# Patient Record
Sex: FEMALE | Race: WHITE | NOT HISPANIC OR LATINO | Employment: OTHER | URBAN - METROPOLITAN AREA
[De-identification: names, ages, dates, MRNs, and addresses within clinical notes are randomized per-mention and may not be internally consistent; named-entity substitution may affect disease eponyms.]

---

## 2017-06-30 ENCOUNTER — APPOINTMENT (OUTPATIENT)
Dept: WOMENS IMAGING | Facility: HOSPITAL | Age: 57
End: 2017-06-30

## 2017-06-30 PROCEDURE — 77063 BREAST TOMOSYNTHESIS BI: CPT | Performed by: RADIOLOGY

## 2017-06-30 PROCEDURE — G0202 SCR MAMMO BI INCL CAD: HCPCS | Performed by: RADIOLOGY

## 2017-06-30 PROCEDURE — 77067 SCR MAMMO BI INCL CAD: CPT | Performed by: RADIOLOGY

## 2017-07-06 ENCOUNTER — OFFICE VISIT (OUTPATIENT)
Dept: OBSTETRICS AND GYNECOLOGY | Age: 57
End: 2017-07-06

## 2017-07-06 VITALS
WEIGHT: 175 LBS | BODY MASS INDEX: 28.12 KG/M2 | HEIGHT: 66 IN | DIASTOLIC BLOOD PRESSURE: 78 MMHG | SYSTOLIC BLOOD PRESSURE: 126 MMHG

## 2017-07-06 DIAGNOSIS — Z80.3 FAMILY HISTORY OF BREAST CANCER: ICD-10-CM

## 2017-07-06 DIAGNOSIS — Z01.419 WELL WOMAN EXAM WITH ROUTINE GYNECOLOGICAL EXAM: Primary | ICD-10-CM

## 2017-07-06 PROCEDURE — 99396 PREV VISIT EST AGE 40-64: CPT | Performed by: PHYSICIAN ASSISTANT

## 2017-07-06 NOTE — PROGRESS NOTES
Subjective     Chief Complaint   Patient presents with   • Gynecologic Exam     annual exam.       History of Present Illness    Marie Yao is a 56 y.o.  who presents for annual exam.  She is doing well.  Has no c/o.  Plans to see PCP next week for routine labs.  Sister just dxed with breast cancer at 50.  Found during a surgery to repair her implants.  She is in the early stages of dx and tx and planning genetic testing.  She also has a MGM that was dxed with breast cancer in her 50's    She is a Dr Hendrickson pt    She has h/o abnormal paps many years ago and had a LEEP.        Obstetric History:  OB History      Para Term  AB TAB SAB Ectopic Multiple Living    0 0 0 0 0 0 0 0 0 0         Menstrual History:     No LMP recorded. Patient is postmenopausal.         Current contraception: post menopausal status  History of abnormal Pap smear: yes - Leep many years ago  Received Gardasil immunization: no  Perform regular self breast exam: yes - occl  Family history of uterine or ovarian cancer: no  Family History of colon cancer: no  Family history of breast cancer: yes - MGM in her 50's and sister just dxed at 50    Mammogram: up to date.  Colonoscopy: up to date. wnl  DEXA: up to date. wnl    Exercise: moderately active  Calcium/Vitamin D: adequate intake and uses supplements    The following portions of the patient's history were reviewed and updated as appropriate: allergies, current medications, past family history, past medical history, past social history, past surgical history and problem list.    Review of Systems   All other systems reviewed and are negative.      Review of Systems   Constitutional: Negative for fatigue.   Respiratory: Negative for shortness of breath.    Gastrointestinal: Negative for abdominal pain.   Genitourinary: Negative for dysuria.   Neurological: Negative for headaches.   Psychiatric/Behavioral: Negative for dysphoric mood.         Objective   Physical  "Exam    /78  Ht 66\" (167.6 cm)  Wt 175 lb (79.4 kg)  Breastfeeding? No  BMI 28.25 kg/m2    General:   alert, appears stated age and cooperative   Neck: no adenopathy and thyroid normal to palpation   Heart: regular rate and rhythm   Lungs: clear to auscultation bilaterally   Abdomen: soft and nontender   Breast: inspection negative, no nipple discharge or bleeding, no masses or nodularity palpable   Vulva: normal   Vagina: normal mucosa, normal discharge   Cervix: no lesions   Uterus: normal size, non-tender   Adnexa: normal adnexa and no mass, fullness, tenderness   Rectal: normal rectal, no masses and guaiac negative stool obtained     Assessment/Plan   Marie was seen today for gynecologic exam.    Diagnoses and all orders for this visit:    Well woman exam with routine gynecological exam  -     Pap IG, Rfx HPV ASCU    Family history of breast cancer  -     [employer] [facility] Hereditary Cancer Screen        All questions answered.  Breast self exam technique reviewed and patient encouraged to perform self-exam monthly.  Discussed healthy lifestyle modifications.  Recommended 30 minutes of aerobic exercise five times per week.  Discussed calcium needs to prevent osteoporosis.      Disc pap guidelines, will do today given her h/o abnormals  Disc genetic testing, will do today.  If not covered, may consider Counsyl             "

## 2017-07-11 LAB
CONV .: ABNORMAL
CYTOLOGIST CVX/VAG CYTO: ABNORMAL
CYTOLOGY CVX/VAG DOC THIN PREP: ABNORMAL
DX ICD CODE: ABNORMAL
DX ICD CODE: ABNORMAL
HIV 1 & 2 AB SER-IMP: ABNORMAL
HPV I/H RISK 1 DNA CVX QL PROBE+SIG AMP: NEGATIVE
OTHER STN SPEC: ABNORMAL
PATH REPORT.FINAL DX SPEC: ABNORMAL
PATHOLOGIST CVX/VAG CYTO: ABNORMAL
STAT OF ADQ CVX/VAG CYTO-IMP: ABNORMAL

## 2017-07-12 ENCOUNTER — TELEPHONE (OUTPATIENT)
Dept: OBSTETRICS AND GYNECOLOGY | Age: 57
End: 2017-07-12

## 2017-07-12 NOTE — TELEPHONE ENCOUNTER
----- Message from SABRINA Cole sent at 7/12/2017  9:09 AM EDT -----  Her pap is mildly abnormal but the HPV is negative, will plan to repeat pap next year

## 2017-07-24 PROBLEM — Z13.71 BRCA NEGATIVE: Status: ACTIVE | Noted: 2017-07-24

## 2018-07-27 ENCOUNTER — APPOINTMENT (OUTPATIENT)
Dept: WOMENS IMAGING | Facility: HOSPITAL | Age: 58
End: 2018-07-27

## 2018-07-27 PROCEDURE — 77063 BREAST TOMOSYNTHESIS BI: CPT | Performed by: RADIOLOGY

## 2018-07-27 PROCEDURE — 77067 SCR MAMMO BI INCL CAD: CPT | Performed by: RADIOLOGY

## 2018-09-21 ENCOUNTER — OFFICE VISIT (OUTPATIENT)
Dept: OBSTETRICS AND GYNECOLOGY | Age: 58
End: 2018-09-21

## 2018-09-21 VITALS
WEIGHT: 181 LBS | SYSTOLIC BLOOD PRESSURE: 130 MMHG | DIASTOLIC BLOOD PRESSURE: 80 MMHG | HEIGHT: 66 IN | BODY MASS INDEX: 29.09 KG/M2

## 2018-09-21 DIAGNOSIS — Z01.419 ENCOUNTER FOR GYNECOLOGICAL EXAMINATION: Primary | ICD-10-CM

## 2018-09-21 PROCEDURE — 99396 PREV VISIT EST AGE 40-64: CPT | Performed by: NURSE PRACTITIONER

## 2018-09-21 NOTE — PROGRESS NOTES
Subjective       History of Present Illness    Chief Complaint   Patient presents with   • Gynecologic Exam     Last pap 17 ASCUS, HPV negative. MG 18.        Marie Yao is a 58 y.o. female who presents for annual exam. She has no problems.  No vaginal bleeding or complaints. No bowel or bladder problems.  She did genetic testing last year and it was negative.  Her sister that had breast cancer is doing well.    OB History    Para Term  AB Living   0 0 0 0 0 0   SAB TAB Ectopic Molar Multiple Live Births   0 0 0   0               The following portions of the patient's history were reviewed and updated as appropriate: allergies, current medications, past family history, past medical history, past social history, past surgical history and problem list.    Current contraception: post menopausal status  History of abnormal Pap smear: yes - years ago, has had LEEP  Received Gardasil immunization: no  Perform regular self breast exam: yes - occasionally  Family history of uterine or ovarian cancer: no  Family History of colon cancer: no  Family history of breast cancer: yes - sister and MGM.  patient is BRCA negative    Mammogram: up to date.  Colonoscopy: up to date.  DEXA: up to date.  Last Pap: ASCUS, HPV negative    Smoking status: Former Smoker                                                              Packs/day: 0.00      Years: 0.00         Smokeless tobacco: Not on file                       Exercise: moderately active  Calcium/Vitamin D: uses supplements    The following portions of the patient's history were reviewed and updated as appropriate: allergies, current medications, past family history, past medical history, past social history, past surgical history and problem list.    Review of Systems   Constitutional: Negative.    HENT: Negative.    Eyes: Negative.    Respiratory: Negative.    Cardiovascular: Negative.    Gastrointestinal: Negative.    Endocrine: Negative.   "  Genitourinary: Negative.    Musculoskeletal: Negative.    Skin: Negative.    Allergic/Immunologic: Negative.    Neurological: Negative.    Hematological: Negative.    Psychiatric/Behavioral: Negative.          Objective   Physical Exam   Constitutional: She is oriented to person, place, and time. She appears well-developed and well-nourished.   Neck: No thyroid mass present.   Cardiovascular: Normal rate, regular rhythm and normal heart sounds.    Pulmonary/Chest: Effort normal and breath sounds normal. Right breast exhibits no mass, no nipple discharge, no skin change and no tenderness. Left breast exhibits no mass, no nipple discharge, no skin change and no tenderness.   Abdominal: Soft. There is no tenderness.   Genitourinary: Vagina normal and uterus normal. There is no rash or lesion on the right labia. There is no rash or lesion on the left labia. Cervix exhibits no motion tenderness, no discharge and no friability. Right adnexum displays no mass and no tenderness. Left adnexum displays no mass and no tenderness.   Neurological: She is alert and oriented to person, place, and time.   Psychiatric: She has a normal mood and affect. Her behavior is normal.   Vitals reviewed.      /80   Ht 167.6 cm (66\")   Wt 82.1 kg (181 lb)   BMI 29.21 kg/m²     Assessment/Plan   Marie was seen today for gynecologic exam.    Diagnoses and all orders for this visit:    Encounter for gynecological examination  -     IGP,rfx Aptima HPV All Pth; Future        Breast self exam technique reviewed and patient encouraged to perform self-exam monthly.  Discussed healthy lifestyle modifications.  Pap smear done today with refelx HPV  Recommended 30 minutes of aerobic exercise five times per week.  Discussed calcium needs to prevent osteoporosis         "

## 2018-09-25 LAB
CONV .: NORMAL
CYTOLOGIST CVX/VAG CYTO: NORMAL
CYTOLOGY CVX/VAG DOC THIN PREP: NORMAL
DX ICD CODE: NORMAL
HIV 1 & 2 AB SER-IMP: NORMAL
OTHER STN SPEC: NORMAL
PATH REPORT.FINAL DX SPEC: NORMAL
STAT OF ADQ CVX/VAG CYTO-IMP: NORMAL

## 2019-07-30 ENCOUNTER — APPOINTMENT (OUTPATIENT)
Dept: WOMENS IMAGING | Facility: HOSPITAL | Age: 59
End: 2019-07-30

## 2019-07-30 PROCEDURE — 77063 BREAST TOMOSYNTHESIS BI: CPT | Performed by: RADIOLOGY

## 2019-07-30 PROCEDURE — 77067 SCR MAMMO BI INCL CAD: CPT | Performed by: RADIOLOGY

## 2020-08-21 ENCOUNTER — APPOINTMENT (OUTPATIENT)
Dept: WOMENS IMAGING | Facility: HOSPITAL | Age: 60
End: 2020-08-21

## 2020-08-21 PROCEDURE — 77063 BREAST TOMOSYNTHESIS BI: CPT | Performed by: RADIOLOGY

## 2020-08-21 PROCEDURE — 77067 SCR MAMMO BI INCL CAD: CPT | Performed by: RADIOLOGY

## 2021-01-13 ENCOUNTER — OFFICE VISIT (OUTPATIENT)
Dept: OBSTETRICS AND GYNECOLOGY | Age: 61
End: 2021-01-13

## 2021-01-13 VITALS
SYSTOLIC BLOOD PRESSURE: 118 MMHG | DIASTOLIC BLOOD PRESSURE: 70 MMHG | HEIGHT: 66 IN | WEIGHT: 151 LBS | BODY MASS INDEX: 24.27 KG/M2

## 2021-01-13 DIAGNOSIS — Z91.89 AT HIGH RISK FOR BREAST CANCER: ICD-10-CM

## 2021-01-13 DIAGNOSIS — Z11.51 SCREENING FOR HPV (HUMAN PAPILLOMAVIRUS): ICD-10-CM

## 2021-01-13 DIAGNOSIS — Z01.419 WELL WOMAN EXAM WITH ROUTINE GYNECOLOGICAL EXAM: Primary | ICD-10-CM

## 2021-01-13 DIAGNOSIS — Z12.31 ENCOUNTER FOR SCREENING MAMMOGRAM FOR BREAST CANCER: ICD-10-CM

## 2021-01-13 LAB
DEVELOPER EXPIRATION DATE: NORMAL
DEVELOPER LOT NUMBER: NORMAL
EXPIRATION DATE: NORMAL
FECAL OCCULT BLOOD SCREEN, POC: NEGATIVE
Lab: 1591
NEGATIVE CONTROL: NEGATIVE
POSITIVE CONTROL: POSITIVE

## 2021-01-13 PROCEDURE — 99396 PREV VISIT EST AGE 40-64: CPT | Performed by: PHYSICIAN ASSISTANT

## 2021-01-13 PROCEDURE — 82274 ASSAY TEST FOR BLOOD FECAL: CPT | Performed by: PHYSICIAN ASSISTANT

## 2021-01-13 RX ORDER — DIPHENOXYLATE HYDROCHLORIDE AND ATROPINE SULFATE 2.5; .025 MG/1; MG/1
TABLET ORAL DAILY
COMMUNITY

## 2021-01-13 NOTE — PROGRESS NOTES
Subjective     Chief Complaint   Patient presents with   • Gynecologic Exam     annual exam c/o pain with intercourse,  last pap 2018 neg/no hpv, m/g 2020 neg, c/scope (cologard) at age 59.       History of Present Illness    Marie Yao is a 60 y.o.  who presents for annual exam.    Pt here for her annual exam  No new med hx  Sees Dr Dudley for routine check ups  Had labs done recently that were wnl    H/o abnormal pap  Will rpt today    Family h/o breast cancer  Had genetic testing which was negative but shows she is at high risk for breast cancer over her lifetime (>29%)  She has not had any f/u on this result  Is agreeable to an appt with general surgeon to discuss POC  She is good about BSE  Mammogram was done in August and was wnl       Pt of Dr Hendrickson  Obstetric History:  OB History        0    Para   0    Term   0       0    AB   0    Living   0       SAB   0    TAB   0    Ectopic   0    Molar        Multiple   0    Live Births                   Menstrual History:     No LMP recorded. Patient is postmenopausal.         Current contraception: post menopausal status  History of abnormal Pap smear: no  Received Gardasil immunization: no  Perform regular self breast exam: yes - mthly  Family history of uterine or ovarian cancer: no  Family History of colon cancer: no  Family history of breast cancer: yes - sister had it, pt tested neg    Mammogram: up to date.  Colonoscopy: up to date.  DEXA: ordered.    Exercise: moderately active  Calcium/Vitamin D: adequate intake    The following portions of the patient's history were reviewed and updated as appropriate: allergies, current medications, past family history, past medical history, past social history, past surgical history and problem list.    Review of Systems    Review of Systems   Constitutional: Negative for fatigue.   Respiratory: Negative for shortness of breath.    Gastrointestinal: Negative for abdominal pain.  "  Genitourinary: Negative for dysuria.   Neurological: Negative for headaches.   Psychiatric/Behavioral: Negative for dysphoric mood.          Objective   Physical Exam    /70   Ht 167.6 cm (66\")   Wt 68.5 kg (151 lb)   Breastfeeding No   BMI 24.37 kg/m²   General:   alert, comfortable and no distress   Heart: regular rate and rhythm   Lungs: clear to auscultation bilaterally   Breast: normal appearance, no masses or tenderness, Inspection negative, No nipple retraction or dimpling, No nipple discharge or bleeding, No axillary or supraclavicular adenopathy, Normal to palpation without dominant masses   Neck: no adenopathy and no carotid bruit   Abdomen: {normal findings: soft, non-tender   CVA: Not performed today   Pelvis: External genitalia: normal general appearance  Vaginal: normal mucosa without prolapse or lesions  Cervix: thin prep PAP obtained  Adnexa: normal bimanual exam  Uterus: normal single, nontender  Rectal: good sphincter tone, no masses, guaiac negative   Extremities: Not performed today   Neurologic: negative   Psychiatric: Normal affect, judgement, and mood     Assessment/Plan   Diagnoses and all orders for this visit:    1. Well woman exam with routine gynecological exam (Primary)  -     IgP, Aptima HPV    2. Encounter for screening mammogram for breast cancer  -     Mammo Screening Digital Tomosynthesis Bilateral With CAD; Future    3. At high risk for breast cancer  -     Ambulatory Referral to General Surgery    4. Screening for HPV (human papillomavirus)  -     IgP, Aptima HPV        All questions answered.  Breast self exam technique reviewed and patient encouraged to perform self-exam monthly.  Discussed healthy lifestyle modifications.  Recommended 30 minutes of aerobic exercise five times per week.  Discussed calcium needs to prevent osteoporosis.    Pap done today  Placed order for mammogram  Referral to general surgery to discuss breast cancer risk and reduction of risk  C/w " BSE  Enc f/u with Derm for routine skin evaluation

## 2021-01-14 ENCOUNTER — PROCEDURE VISIT (OUTPATIENT)
Dept: OBSTETRICS AND GYNECOLOGY | Age: 61
End: 2021-01-14

## 2021-01-14 DIAGNOSIS — Z78.0 POST-MENOPAUSAL: Primary | ICD-10-CM

## 2021-01-14 PROCEDURE — 77080 DXA BONE DENSITY AXIAL: CPT | Performed by: OBSTETRICS & GYNECOLOGY

## 2021-01-14 PROCEDURE — 77067 SCR MAMMO BI INCL CAD: CPT | Performed by: OBSTETRICS & GYNECOLOGY

## 2021-01-16 LAB
CYTOLOGIST CVX/VAG CYTO: NORMAL
CYTOLOGY CVX/VAG DOC CYTO: NORMAL
CYTOLOGY CVX/VAG DOC THIN PREP: NORMAL
DX ICD CODE: NORMAL
HIV 1 & 2 AB SER-IMP: NORMAL
HPV I/H RISK 4 DNA CVX QL PROBE+SIG AMP: NEGATIVE
OTHER STN SPEC: NORMAL
STAT OF ADQ CVX/VAG CYTO-IMP: NORMAL

## 2021-01-26 ENCOUNTER — TELEPHONE (OUTPATIENT)
Dept: OBSTETRICS AND GYNECOLOGY | Age: 61
End: 2021-01-26

## 2021-02-02 ENCOUNTER — OFFICE VISIT (OUTPATIENT)
Dept: MAMMOGRAPHY | Facility: CLINIC | Age: 61
End: 2021-02-02

## 2021-02-02 VITALS
BODY MASS INDEX: 27.57 KG/M2 | DIASTOLIC BLOOD PRESSURE: 78 MMHG | SYSTOLIC BLOOD PRESSURE: 142 MMHG | HEART RATE: 82 BPM | TEMPERATURE: 97.1 F | OXYGEN SATURATION: 95 % | HEIGHT: 65 IN | WEIGHT: 165.5 LBS

## 2021-02-02 DIAGNOSIS — Z91.89 AT HIGH RISK FOR BREAST CANCER: Primary | ICD-10-CM

## 2021-02-02 PROCEDURE — 99204 OFFICE O/P NEW MOD 45 MIN: CPT | Performed by: SURGERY

## 2021-02-02 RX ORDER — NICOTINE POLACRILEX 2 MG
GUM BUCCAL
COMMUNITY

## 2021-02-02 RX ORDER — MULTIVIT-MIN/IRON/FOLIC ACID/K 18-600-40
2 CAPSULE ORAL
COMMUNITY

## 2021-02-02 NOTE — PROGRESS NOTES
Chief Complaint: Marie Yao is a 60 y.o.. female here today for BRCA Mutation        History of Present Illness:  Patient presents with management of breast cancer risk.   She is a very nice 60-year-old white female with a strong family history for breast cancer.  She has a maternal grandmother and a sister who had breast cancer diagnosed at age 50.  The patient recently underwent genetic testing which was negative.  Her calculated lifetime risk was 29.9% and her 5-year risk was 5.1%.  She does have a history of a prior benign right breast biopsy.  According to the patient there was no atypia to her knowledge.  She has not had any children.  Her most recent imaging did not show any abnormalities.  I have personally reviewed those imaging studies and agree with those findings.  She does not take hormone replacement therapy.    Review of Systems:  Review of Systems   Skin:        The patient denies any noticeable changes to the skin of the breast.    All other systems reviewed and are negative.     I have reviewed the ROS as documented by the MA/LPN/RN Jah Millard MD      Past Medical and Surgical History:  Breast Biopsy History:  Patient has had the following breast biopsies:03/2003 benign  Breast Cancer HIstory:  Patient does not have a past medical history of breast cancer.  Breast Operations, and year:  0  Social History     Tobacco Use   Smoking Status Former Smoker     Obstetric History:  Patient is postmenopausal, entered menopause naturally at age: 49   Number of pregnancies:0  Number of live births: 0  Number of abortions or miscarriages: 0  Age of delivery of first child: 0  Patient did not breast feed.  Length of time taking birth control pills:10 years  Patient has never taken hormone replacement    Past Surgical History:   Procedure Laterality Date   • BREAST BIOPSY Right 03/28/2003    Benign fibroadenoma   • CERVICAL BIOPSY  W/ LOOP ELECTRODE EXCISION  04/19/1995    Ant CX: mild inflammation.   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  Margins neg.   • COLONOSCOPY  2009    WNL   • COLPOSCOPY W/ BIOPSY / CURETTAGE  03/01/1995    cx bx 7:00 mild dysplasia.   • GASTRIC RESTRICTION SURGERY  1986   • GYNECOLOGIC CRYOSURGERY  1982   • LAPAROSCOPIC TUBAL LIGATION  12/2005       Past Medical History:   Diagnosis Date   • Breast lump 2003    bx showed benign fibroadenoma   • Cervical dysplasia 1995   • Cervical dysplasia 1982       Prior Hospitalizations, other than for surgery or childbirth, and year:  0    Social History:  Patient is single.  Patient has no children.    Family History:  Family History   Problem Relation Age of Onset   • Heart disease Mother         4 vessel bypass    • Breast cancer Maternal Grandmother 68   • Breast cancer Sister 50       Vital Signs:  Vitals:    02/02/21 0824   BP: 142/78   Pulse: 82   Temp: 97.1 °F (36.2 °C)   SpO2: 95%       Medications:    Current Outpatient Prescriptions:     Current Outpatient Medications:   •  Biotin 1 MG capsule, Take  by mouth., Disp: , Rfl:   •  POTASSIUM GLUCONATE PO, Take  by mouth., Disp: , Rfl:   •  Vitamin D, Cholecalciferol, 50 MCG (2000 UT) capsule, Take 2 capsules by mouth., Disp: , Rfl:   •  multivitamin (MULTI-VITAMIN DAILY PO), Take  by mouth Daily., Disp: , Rfl:     Physical Examination:  General Appearance:   Patient is in no distress.  She is well kept and has an overweight build.   Psychiatric:  Patient with appropriate mood and affect. Alert and oriented to self, time, and place.    Breast, RIGHT:  large sized, symmetric with the contralateral side.  Breast skin is without erythema, edema, rashes.  There are no visible abnormalities upon inspection during the arm-raising maneuver or with hands on hips in the sitting position. There is no nipple retraction, discharge or nipple/areolar skin changes.There are no masses palpable in the sitting or supine positions.    Breast, LEFT:  large sized, symmetric with the  contralateral side.  Breast skin is without erythema, edema, rashes.  There are no visible abnormalities upon inspection during the arm-raising maneuver or with hands on hips in the sitting position. There is no nipple retraction, discharge or nipple/areolar skin changes.There are no masses palpable in the sitting or supine positions.    Lymphatic:  There is no axillary, cervical, infraclavicular, or supraclavicular adenopathy bilaterally.  Eyes:  Pupils are round and reactive to light.  Cardiovascular:  Heart rate and rhythm are regular.  Respiratory:  Lungs are clear bilaterally with no crackles or wheezes in any lung field.  Gastrointestinal:  Abdomen is soft, nondistended, and nontender.  There was no obvious hepatosplenomegaly or abdominal mass.  There is a long midline scar from a gastric stapling 30 years ago.    Musculoskeletal:  Good strength in all 4 extremities.   There is good range of motion in both shoulders.    Skin:  No new skin lesions or rashes on the skin excluding the breast (see breast exam above).    Assessment:  1. At high risk for breast cancer          Plan:  We reviewed her imaging, history, exam, pathology, and family history together today. Her most significant risk factors for breast cancer include the following:  I calculated her lifetime risk of breast cancer using the Tyrer-Cuzick model as: 29.9%  I calculated her 5-year risk of breast cancer using the Paige model (not useful for women under the age of 35 years) as: 5.1%.  With a lifetime risk of breast cancer greater than 20%, the current recommendations for screening include annual mammography and annual MRI, with clinical examination. She is interested in pursuing this.  We also discussed that for a Paige model 5 year risk greater than 1.7% or LCIS, and a life expectancy > 10 years, that we recommend risk reduction counseling with the medical oncologists about chemopreventive agents such as tamoxifen, raloxifene, or exemestane.  She  was not interested in pursuing that at this point in time but knows that she can always go that route if she desires.  We will go ahead and order an MRI and I will call her with those results.  I also would plan to see her back in the office in 1 year.      CPT coding:    Next Appointment:  No follow-ups on file.            EMR Dragon/transcription disclaimer:    Much of this encounter note is an electronic transcription/translocation of spoken language to printed text.  The electronic translation of spoken language may permit erroneous, or at times, nonsensical words or phrases to be inadvertently transcribed.  Although I have reviewed the note from such areas, some may still exist.

## 2021-02-19 ENCOUNTER — APPOINTMENT (OUTPATIENT)
Dept: MRI IMAGING | Facility: HOSPITAL | Age: 61
End: 2021-02-19

## 2021-03-10 ENCOUNTER — HOSPITAL ENCOUNTER (OUTPATIENT)
Dept: MRI IMAGING | Facility: HOSPITAL | Age: 61
Discharge: HOME | End: 2021-03-10
Admitting: SURGERY

## 2021-03-10 ENCOUNTER — TELEPHONE (OUTPATIENT)
Dept: MAMMOGRAPHY | Facility: CLINIC | Age: 61
End: 2021-03-10

## 2021-03-10 DIAGNOSIS — Z91.89 AT HIGH RISK FOR BREAST CANCER: ICD-10-CM

## 2021-03-10 LAB — CREAT BLDA-MCNC: 0.5 MG/DL (ref 0.6–1.3)

## 2021-03-10 PROCEDURE — 77049 MRI BREAST C-+ W/CAD BI: CPT

## 2021-03-10 PROCEDURE — 82565 ASSAY OF CREATININE: CPT

## 2021-03-10 PROCEDURE — A9577 INJ MULTIHANCE: HCPCS | Performed by: SURGERY

## 2021-03-10 PROCEDURE — 0 GADOBENATE DIMEGLUMINE 529 MG/ML SOLUTION: Performed by: SURGERY

## 2021-03-10 RX ADMIN — GADOBENATE DIMEGLUMINE 15 ML: 529 INJECTION, SOLUTION INTRAVENOUS at 09:23

## 2021-03-10 NOTE — TELEPHONE ENCOUNTER
I told her the breast MRI was negative.  They did not see any evidence of malignancy.  They did notice a 3 cm hepatic lesion likely representing a hemangioma.  I told her that she needs to bring this to the attention of her primary care doctor and she will do that.  I will see her in the office in 1 year.

## 2021-03-16 ENCOUNTER — BULK ORDERING (OUTPATIENT)
Dept: CASE MANAGEMENT | Facility: OTHER | Age: 61
End: 2021-03-16

## 2021-03-16 DIAGNOSIS — Z23 IMMUNIZATION DUE: ICD-10-CM

## 2021-08-23 ENCOUNTER — APPOINTMENT (OUTPATIENT)
Dept: WOMENS IMAGING | Facility: HOSPITAL | Age: 61
End: 2021-08-23

## 2021-08-23 PROCEDURE — 77063 BREAST TOMOSYNTHESIS BI: CPT | Performed by: RADIOLOGY

## 2021-08-23 PROCEDURE — 77067 SCR MAMMO BI INCL CAD: CPT | Performed by: RADIOLOGY

## 2022-01-18 ENCOUNTER — OFFICE VISIT (OUTPATIENT)
Dept: OBSTETRICS AND GYNECOLOGY | Age: 62
End: 2022-01-18

## 2022-01-18 VITALS
SYSTOLIC BLOOD PRESSURE: 132 MMHG | HEIGHT: 66 IN | WEIGHT: 159 LBS | BODY MASS INDEX: 25.55 KG/M2 | DIASTOLIC BLOOD PRESSURE: 84 MMHG

## 2022-01-18 DIAGNOSIS — Z01.419 WELL WOMAN EXAM WITH ROUTINE GYNECOLOGICAL EXAM: Primary | ICD-10-CM

## 2022-01-18 DIAGNOSIS — Z91.89 AT HIGH RISK FOR BREAST CANCER: ICD-10-CM

## 2022-01-18 DIAGNOSIS — Z13.71 BRCA NEGATIVE: ICD-10-CM

## 2022-01-18 PROCEDURE — 99396 PREV VISIT EST AGE 40-64: CPT | Performed by: PHYSICIAN ASSISTANT

## 2022-01-18 RX ORDER — ESCITALOPRAM OXALATE 10 MG/1
TABLET ORAL
COMMUNITY
Start: 2021-12-28

## 2022-01-18 NOTE — PROGRESS NOTES
Subjective     Chief Complaint   Patient presents with   • Gynecologic Exam     annual exam last pap 2021 neg/neg, mg/ 2021, dexa , cologard at 59       History of Present Illness    Marie Yao is a 61 y.o.  who presents for annual exam.    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 Patient is postmenopausal.         Current contraception: post menopausal status  History of abnormal Pap smear: no  Received Gardasil immunization: no  Perform regular self breast exam: yes - occl  Family history of uterine or ovarian cancer: no  Family History of colon cancer: no  Family history of breast cancer: yes - pt tested negative but at high risk    Mammogram: up to date.  Colonoscopy: recommended.  DEXA: up to date.    Exercise: moderately active  Calcium/Vitamin D: adequate intake    The following portions of the patient's history were reviewed and updated as appropriate: allergies, current medications, past family history, past medical history, past social history, past surgical history and problem list.    Review of Systems    Review of Systems   Constitutional: Negative for fatigue.   Respiratory: Negative for shortness of breath.    Gastrointestinal: Negative for abdominal pain.   Genitourinary: Negative for dysuria.   Neurological: Negative for headaches.   Psychiatric/Behavioral: Negative for dysphoric mood.          Objective  "  Physical Exam    /84   Ht 167.6 cm (66\")   Wt 72.1 kg (159 lb)   BMI 25.66 kg/m²   General:   alert, comfortable and no distress   Heart: regular rate and rhythm   Lungs: clear to auscultation bilaterally   Breast: normal appearance, no masses or tenderness, Inspection negative, No nipple retraction or dimpling, No nipple discharge or bleeding, No axillary or supraclavicular adenopathy, Normal to palpation without dominant masses   Neck: no adenopathy and no carotid bruit   Abdomen: {normal findings: soft, non-tender   CVA: Not performed today   Pelvis: External genitalia: normal general appearance  Vaginal: normal mucosa without prolapse or lesions  Cervix: normal appearance  Adnexa: normal bimanual exam  Uterus: normal single, nontender   Extremities: Not performed today   Neurologic: negative   Psychiatric: Normal affect, judgement, and mood     Assessment/Plan   Diagnoses and all orders for this visit:    1. Well woman exam with routine gynecological exam (Primary)    2. At high risk for breast cancer    3. BRCA negative        All questions answered.  Breast self exam technique reviewed and patient encouraged to perform self-exam monthly.  Discussed healthy lifestyle modifications.  Recommended 30 minutes of aerobic exercise five times per week.  Discussed calcium needs to prevent osteoporosis.    Pap utd  Mammogram utd, will f/u with [facility] for MRI  Plan colonoscopy  Call for any issues               "

## 2022-03-04 ENCOUNTER — TELEPHONE (OUTPATIENT)
Dept: OBSTETRICS AND GYNECOLOGY | Age: 62
End: 2022-03-04

## 2022-03-04 NOTE — TELEPHONE ENCOUNTER
Pt called and said she would like a rx for premarin, after using the sample she was given at her appt. Pharmacy [facility] on [address] verified. Please advise.

## 2022-03-07 ENCOUNTER — TELEPHONE (OUTPATIENT)
Dept: OBSTETRICS AND GYNECOLOGY | Age: 62
End: 2022-03-07

## 2022-03-07 RX ORDER — ESTRADIOL 10 UG/1
INSERT VAGINAL
Qty: 20 TABLET | Refills: 2 | Status: SHIPPED | OUTPATIENT
Start: 2022-03-07 | End: 2022-03-10 | Stop reason: SDUPTHER

## 2022-03-07 RX ORDER — CONJUGATED ESTROGENS 0.62 MG/G
CREAM VAGINAL DAILY
Qty: 30 G | Refills: 1 | Status: SHIPPED | OUTPATIENT
Start: 2022-03-07 | End: 2022-03-07

## 2022-03-07 NOTE — TELEPHONE ENCOUNTER
Looks like vagifem should be covered. It it isn't, I would suggest she look up vaginal estrogen on her insurance to find out what would be most affordable.

## 2022-03-10 ENCOUNTER — TELEPHONE (OUTPATIENT)
Dept: OBSTETRICS AND GYNECOLOGY | Age: 62
End: 2022-03-10

## 2022-03-10 RX ORDER — ESTRADIOL 10 UG/1
INSERT VAGINAL
Qty: 20 TABLET | Refills: 2 | Status: SHIPPED | OUTPATIENT
Start: 2022-03-10 | End: 2022-07-26

## 2022-03-10 NOTE — TELEPHONE ENCOUNTER
Pt calls asking for her vagifem to be sent to [facility] pharmacy, pt states [facility] does not accept [facility]. Please advise

## 2022-03-16 ENCOUNTER — OFFICE VISIT (OUTPATIENT)
Dept: MAMMOGRAPHY | Facility: CLINIC | Age: 62
End: 2022-03-16

## 2022-03-16 VITALS
HEIGHT: 65 IN | RESPIRATION RATE: 16 BRPM | OXYGEN SATURATION: 98 % | BODY MASS INDEX: 26.66 KG/M2 | HEART RATE: 72 BPM | SYSTOLIC BLOOD PRESSURE: 124 MMHG | DIASTOLIC BLOOD PRESSURE: 82 MMHG | WEIGHT: 160 LBS

## 2022-03-16 DIAGNOSIS — Z91.89 AT HIGH RISK FOR BREAST CANCER: Primary | ICD-10-CM

## 2022-03-16 PROCEDURE — 99213 OFFICE O/P EST LOW 20 MIN: CPT | Performed by: SURGERY

## 2022-03-16 NOTE — PROGRESS NOTES
Subjective   Marie Yao is a 61 y.o. female     History of Present Illness she is a nice 61-year-old white female with a strong family history for breast cancer.  I last saw her 1 year ago in the office.  After that visit she did obtain an MRI which did not show any suspicious enhancement or masses in either breast.  They did describe a 3 cm hepatic lesion that was likely a hemangioma.  This was followed up with an abdominal MRI which confirmed that.    Our risk assessment models calculate her lifetime risk at 29.9% and her 5-year risk at 5.1%.  She has declined medical risk reduction up to this time.    Her family history has not changed since her last visit here.        Review of Systems constitutional-no unusual changes in weight or appetite  Past Medical History:   Diagnosis Date   • Breast lump 2003    bx showed benign fibroadenoma   • Cervical dysplasia 1995   • Cervical dysplasia 1982     Past Surgical History:   Procedure Laterality Date   • BREAST BIOPSY Right 03/28/2003    Benign fibroadenoma   • CERVICAL BIOPSY  W/ LOOP ELECTRODE EXCISION  04/19/1995    Ant CX: mild inflammation.  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  Margins neg.   • COLONOSCOPY  2009    WNL   • COLPOSCOPY W/ BIOPSY / CURETTAGE  03/01/1995    cx bx 7:00 mild dysplasia.   • GASTRIC RESTRICTION SURGERY  1986   • GYNECOLOGIC CRYOSURGERY  1982   • LAPAROSCOPIC TUBAL LIGATION  12/2005     Family History   Problem Relation Age of Onset   • Heart disease Mother         4 vessel bypass    • Breast cancer Maternal Grandmother 68   • Breast cancer Sister 50     Social History     Socioeconomic History   • Marital status:    Tobacco Use   • Smoking status: Former Smoker   Vaping Use   • Vaping Use: Never used   Substance and Sexual Activity   • Alcohol use: Yes     Alcohol/week: 3.0 standard drinks     Types: 3 Glasses of wine per week   • Sexual activity: Defer     Birth control/protection: Post-menopausal        Objective   Physical Exam   Constitutional-average weight white female in no acute distress  Right breast-there are no changes to the skin of the breast and I do not see any skin dimpling, nipple retraction, or nipple discharge.  There are no worrisome masses in the breast.  Left breast-I do not detect any changes to the skin of the breast.  There is no skin dimpling, nipple retraction, or nipple discharge.  I do not palpate any worrisome masses in the breast.  Lymphatics-there is no cervical or axillary adenopathy  Abdomen-soft and nontender without masses or hepatosplenomegaly.    Assessment/Plan    At high risk for breast cancer.  Her imaging would appear to be up-to-date and I do not detect any problems on physical examination.  My plans are to see her back in the office in 1 year.  I have ordered an MRI to be performed in the near future and will call her with those results.  The patient again did not want to consider medical risk reduction at this point in time.      The encounter diagnosis was At high risk for breast cancer.

## 2022-04-27 ENCOUNTER — HOSPITAL ENCOUNTER (OUTPATIENT)
Dept: MRI IMAGING | Facility: HOSPITAL | Age: 62
Discharge: HOME | End: 2022-04-27
Admitting: SURGERY

## 2022-04-27 DIAGNOSIS — Z91.89 AT HIGH RISK FOR BREAST CANCER: ICD-10-CM

## 2022-04-27 LAB — CREAT BLDA-MCNC: 0.6 MG/DL (ref 0.6–1.3)

## 2022-04-27 PROCEDURE — A9577 INJ MULTIHANCE: HCPCS | Performed by: SURGERY

## 2022-04-27 PROCEDURE — 82565 ASSAY OF CREATININE: CPT

## 2022-04-27 PROCEDURE — 0 GADOBENATE DIMEGLUMINE 529 MG/ML SOLUTION: Performed by: SURGERY

## 2022-04-27 PROCEDURE — 77049 MRI BREAST C-+ W/CAD BI: CPT

## 2022-04-27 RX ADMIN — GADOBENATE DIMEGLUMINE 15 ML: 529 INJECTION, SOLUTION INTRAVENOUS at 08:06

## 2022-05-02 ENCOUNTER — TELEPHONE (OUTPATIENT)
Dept: MAMMOGRAPHY | Facility: CLINIC | Age: 62
End: 2022-05-02

## 2022-05-02 NOTE — TELEPHONE ENCOUNTER
I told her that the recent MRI did not show any abnormalities in either breast or axilla.  She is to have mammograms in 6 months.  I will see her back in the office in approximately 1 year.

## 2022-07-26 RX ORDER — ESTRADIOL 10 UG/1
INSERT VAGINAL
Qty: 24 TABLET | Refills: 2 | Status: SHIPPED | OUTPATIENT
Start: 2022-07-26

## 2022-08-26 ENCOUNTER — APPOINTMENT (OUTPATIENT)
Dept: WOMENS IMAGING | Facility: HOSPITAL | Age: 62
End: 2022-08-26

## 2022-08-26 PROCEDURE — 77067 SCR MAMMO BI INCL CAD: CPT | Performed by: RADIOLOGY

## 2022-08-26 PROCEDURE — 77063 BREAST TOMOSYNTHESIS BI: CPT | Performed by: RADIOLOGY

## 2022-08-31 ENCOUNTER — TELEPHONE (OUTPATIENT)
Dept: OBSTETRICS AND GYNECOLOGY | Age: 62
End: 2022-08-31

## 2022-09-16 NOTE — TELEPHONE ENCOUNTER
Pt called stating that she still has not been able to get her estradiol. I contacted her pharmacy and was told that they do not have estradiol in stock due to likely backorder. Is there an alternative she can try? Please advise.

## 2022-09-19 RX ORDER — CONJUGATED ESTROGENS 0.62 MG/G
CREAM VAGINAL 2 TIMES WEEKLY
Qty: 30 G | Refills: 1 | Status: SHIPPED | OUTPATIENT
Start: 2022-09-19

## 2022-09-19 NOTE — TELEPHONE ENCOUNTER
I sent in premarin vaginally but it is not on her formulary. She may have to look into other pharmacies to find the estradiol

## 2023-05-31 ENCOUNTER — OFFICE VISIT (OUTPATIENT)
Dept: MAMMOGRAPHY | Facility: CLINIC | Age: 63
End: 2023-05-31

## 2023-05-31 VITALS
HEIGHT: 66 IN | OXYGEN SATURATION: 96 % | WEIGHT: 165 LBS | HEART RATE: 70 BPM | BODY MASS INDEX: 26.52 KG/M2 | SYSTOLIC BLOOD PRESSURE: 154 MMHG | DIASTOLIC BLOOD PRESSURE: 70 MMHG

## 2023-05-31 DIAGNOSIS — Z91.89 AT HIGH RISK FOR BREAST CANCER: Primary | ICD-10-CM

## 2023-05-31 NOTE — PROGRESS NOTES
Subjective   Marie Yao is a 62 y.o. female     History of Present Illness   She is a very nice 62-year-old white female at high risk for breast cancer.  Previous risk assessment has estimated her lifetime risk for breast cancer at 29% and her 5-year risk is 5.1%.  She has not wanted to pursue chemoprevention and still feels that way.  There have been no changes in family history.    I have personally reviewed the patient's last mammograms which were in August 2022.  The breast tissue is mostly fatty replaced with no suspicious microcalcification, masses, or areas of architectural distortion.  I also reviewed the MRI that she had in April 2022.  There were no areas of suspicious masslike or non-mass-like enhancement and the lymph nodes look fine.  She has a stable hemangioma of the liver.    The patient denies any symptoms related to her breast at this point.      Review of Systems constitutional-no unusual changes in weight or appetite.  Past Medical History:   Diagnosis Date   • Breast lump 2003    bx showed benign fibroadenoma   • Cervical dysplasia 1995   • Cervical dysplasia 1982     Past Surgical History:   Procedure Laterality Date   • BREAST BIOPSY Right 03/28/2003    Benign fibroadenoma   • CERVICAL BIOPSY  W/ LOOP ELECTRODE EXCISION  04/19/1995    Ant CX: mild inflammation.  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  Margins neg.   • COLONOSCOPY  2009    WNL   • COLPOSCOPY W/ BIOPSY / CURETTAGE  03/01/1995    cx bx 7:00 mild dysplasia.   • GASTRIC RESTRICTION SURGERY  1986   • GYNECOLOGIC CRYOSURGERY  1982   • LAPAROSCOPIC TUBAL LIGATION  12/2005     Family History   Problem Relation Age of Onset   • Heart disease Mother         4 vessel bypass    • Breast cancer Maternal Grandmother 68   • Breast cancer Sister 50     Social History     Socioeconomic History   • Marital status:    Tobacco Use   • Smoking status: Former   Vaping Use   • Vaping Use: Never used   Substance and Sexual  Activity   • Alcohol use: Yes     Alcohol/week: 3.0 standard drinks     Types: 3 Glasses of wine per week   • Sexual activity: Defer     Birth control/protection: Post-menopausal       Objective   Physical Exam   Constitutional- BMI 26.6, no acute distress  Left breast-large and symmetrical.  The skin of the breast looks normal.  With the arms raised and the pectoralis muscle contracted there is no skin dimpling or nipple retraction.  I could not elicit any nipple discharge.  There were no worrisome palpable masses.  There is very little nodularity to the breast tissue making the exam fairly easy.  Right breast-large and symmetrical.  There are no changes to the skin of the breast and no evidence of nipple discharge.  With the arms raised and the pectoralis muscle contracted I do not see any skin dimpling or nipple retraction.  There is very little fibrocystic nodularity and no worrisome palpable masses.  Lymphatics-no cervical or axillary adenopathy.    Assessment & Plan   At high risk for breast cancer- she is due for her imaging.  I will order an MRI and call her with those results.  She has an appointment with the OB/GYN in the fall and mammograms will be obtained at that time.  If her imaging looks good, I will see her back in 1 year.  She again does not desire to pursue chemoprevention.    The encounter diagnosis was At high risk for breast cancer.

## 2023-05-31 NOTE — LETTER
May 31, 2023     SABRINA Cole  [address] [place] [address]    Patient: Marie Yao   YOB: 1960   Date of Visit: 5/31/2023       Dear SABRINA Rangel:    Thank you for referring Marie Yao to me for evaluation. Below are the relevant portions of my assessment and plan of care.    If you have questions, please do not hesitate to call me. I look forward to following Marie along with you.         Sincerely,        Jah Millard MD        CC: DO Freeman Guillen William P, MD  05/31/23 0821  Signed  Subjective    Marie Yao is a 62 y.o. female     History of Present Illness   She is a very nice 62-year-old white female at high risk for breast cancer.  Previous risk assessment has estimated her lifetime risk for breast cancer at 29% and her 5-year risk is 5.1%.  She has not wanted to pursue chemoprevention and still feels that way.  There have been no changes in family history.    I have personally reviewed the patient's last mammograms which were in August 2022.  The breast tissue is mostly fatty replaced with no suspicious microcalcification, masses, or areas of architectural distortion.  I also reviewed the MRI that she had in April 2022.  There were no areas of suspicious masslike or non-mass-like enhancement and the lymph nodes look fine.  She has a stable hemangioma of the liver.    The patient denies any symptoms related to her breast at this point.      Review of Systems constitutional-no unusual changes in weight or appetite.  Past Medical History:   Diagnosis Date   • Breast lump 2003    bx showed benign fibroadenoma   • Cervical dysplasia 1995   • Cervical dysplasia 1982     Past Surgical History:   Procedure Laterality Date   • BREAST BIOPSY Right 03/28/2003    Benign fibroadenoma   • CERVICAL BIOPSY  W/ LOOP ELECTRODE EXCISION  04/19/1995    Ant CX: mild inflammation.  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  Margins neg.   • COLONOSCOPY   2009    WNL   • COLPOSCOPY W/ BIOPSY / CURETTAGE  03/01/1995    cx bx 7:00 mild dysplasia.   • GASTRIC RESTRICTION SURGERY  1986   • GYNECOLOGIC CRYOSURGERY  1982   • LAPAROSCOPIC TUBAL LIGATION  12/2005     Family History   Problem Relation Age of Onset   • Heart disease Mother         4 vessel bypass    • Breast cancer Maternal Grandmother 68   • Breast cancer Sister 50     Social History     Socioeconomic History   • Marital status:    Tobacco Use   • Smoking status: Former   Vaping Use   • Vaping Use: Never used   Substance and Sexual Activity   • Alcohol use: Yes     Alcohol/week: 3.0 standard drinks     Types: 3 Glasses of wine per week   • Sexual activity: Defer     Birth control/protection: Post-menopausal       Objective    Physical Exam   Constitutional- BMI 26.6, no acute distress  Left breast-large and symmetrical.  The skin of the breast looks normal.  With the arms raised and the pectoralis muscle contracted there is no skin dimpling or nipple retraction.  I could not elicit any nipple discharge.  There were no worrisome palpable masses.  There is very little nodularity to the breast tissue making the exam fairly easy.  Right breast-large and symmetrical.  There are no changes to the skin of the breast and no evidence of nipple discharge.  With the arms raised and the pectoralis muscle contracted I do not see any skin dimpling or nipple retraction.  There is very little fibrocystic nodularity and no worrisome palpable masses.  Lymphatics-no cervical or axillary adenopathy.    Assessment & Plan   At high risk for breast cancer- she is due for her imaging.  I will order an MRI and call her with those results.  She has an appointment with the OB/GYN in the fall and mammograms will be obtained at that time.  If her imaging looks good, I will see her back in 1 year.  She again does not desire to pursue chemoprevention.    The encounter diagnosis was At high risk for breast cancer.

## 2023-07-27 ENCOUNTER — OFFICE VISIT (OUTPATIENT)
Dept: OBSTETRICS AND GYNECOLOGY | Age: 63
End: 2023-07-27
Payer: COMMERCIAL

## 2023-07-27 VITALS
HEIGHT: 66 IN | SYSTOLIC BLOOD PRESSURE: 122 MMHG | DIASTOLIC BLOOD PRESSURE: 72 MMHG | BODY MASS INDEX: 25.55 KG/M2 | WEIGHT: 159 LBS

## 2023-07-27 DIAGNOSIS — Z01.419 WELL WOMAN EXAM WITH ROUTINE GYNECOLOGICAL EXAM: Primary | ICD-10-CM

## 2023-07-27 DIAGNOSIS — Z13.71 BRCA NEGATIVE: ICD-10-CM

## 2023-07-27 DIAGNOSIS — Z91.89 AT HIGH RISK FOR BREAST CANCER: ICD-10-CM

## 2023-07-27 PROCEDURE — 99396 PREV VISIT EST AGE 40-64: CPT | Performed by: PHYSICIAN ASSISTANT

## 2023-07-27 RX ORDER — ESTRADIOL 10 UG/1
INSERT VAGINAL
Qty: 24 TABLET | Refills: 3 | Status: SHIPPED | OUTPATIENT
Start: 2023-07-27

## 2023-07-27 NOTE — PROGRESS NOTES
"Subjective     Chief Complaint   Patient presents with    Gynecologic Exam     Annual exam last pap 2021 neg/neg, m/g 2022. Cologuard at 59, c/o still having dry, painful intercourse.  (Wants refills on her estradiol 10mcg)       History of Present Illness    Marie Yao is a 62 y.o.  who presents for annual exam.    Had CSC done in the past year  Did have 7 polyps so will plan f/u in 3-5 years    Did see Dr Millard in May  MRI scheduled     Ran out of estradiol and needs a refill    Is retired  Takes care of her parents, in their 80's    Obstetric History:  OB History          0    Para   0    Term   0       0    AB   0    Living   0         SAB   0    IAB   0    Ectopic   0    Molar        Multiple   0    Live Births                   Menstrual History:     No LMP recorded. Patient is postmenopausal.         Current contraception: post menopausal status  History of abnormal Pap smear: no  Received Gardasil immunization: no  Perform regular self breast exam: yes - occl  Family history of uterine or ovarian cancer: no  Family History of colon cancer: no  Family history of breast cancer: no    Mammogram: up to date.  Colonoscopy: up to date.  DEXA: not indicated.    Exercise: moderately active  Calcium/Vitamin D: adequate intake    The following portions of the patient's history were reviewed and updated as appropriate: allergies, current medications, past family history, past medical history, past social history, past surgical history, and problem list.    Review of Systems   All other systems reviewed and are negative.    Review of Systems   Constitutional: Negative for fatigue.   Respiratory: Negative for shortness of breath.    Gastrointestinal: Negative for abdominal pain.   Genitourinary: Negative for dysuria.   Neurological: Negative for headaches.   Psychiatric/Behavioral: Negative for dysphoric mood.         Objective   Physical Exam    /72   Ht 167.6 cm (66\") "   Wt 72.1 kg (159 lb)   BMI 25.66 kg/m²   General:   alert, comfortable, and no distress   Heart: regular rate and rhythm   Lungs: clear to auscultation bilaterally   Breast: normal appearance, no masses or tenderness, Inspection negative, No nipple retraction or dimpling, No nipple discharge or bleeding, No axillary or supraclavicular adenopathy, Normal to palpation without dominant masses   Neck: no adenopathy and no carotid bruit   Abdomen: normal findings: soft, non-tender   CVA: Not performed today   Pelvis: External genitalia: normal general appearance  Vaginal: normal mucosa without prolapse or lesions  Cervix: normal appearance  Adnexa: normal bimanual exam and non palpable  Uterus: normal single, nontender   Extremities: Not performed today   Neurologic: negative   Psychiatric: Normal affect, judgement, and mood     Assessment & Plan   Diagnoses and all orders for this visit:    1. Well woman exam with routine gynecological exam (Primary)    2. BRCA negative    3. At high risk for breast cancer    Other orders  -     estradiol (VAGIFEM) 10 MCG tablet vaginal tablet; Use 1 pv twice weekly  Dispense: 24 tablet; Refill: 3        All questions answered.  Breast self exam technique reviewed and patient encouraged to perform self-exam monthly.  Discussed healthy lifestyle modifications.  Recommended 30 minutes of aerobic exercise five times per week.  Discussed calcium needs to prevent osteoporosis.    Pap utd  MRI of breasts Sunday  Refilled estradiol

## 2023-07-30 ENCOUNTER — HOSPITAL ENCOUNTER (OUTPATIENT)
Dept: MRI IMAGING | Facility: HOSPITAL | Age: 63
Discharge: HOME | End: 2023-07-30
Admitting: SURGERY
Payer: COMMERCIAL

## 2023-07-30 DIAGNOSIS — Z91.89 AT HIGH RISK FOR BREAST CANCER: ICD-10-CM

## 2023-07-30 PROCEDURE — 77049 MRI BREAST C-+ W/CAD BI: CPT

## 2023-07-30 PROCEDURE — A9577 INJ MULTIHANCE: HCPCS | Performed by: SURGERY

## 2023-07-30 PROCEDURE — 0 GADOBENATE DIMEGLUMINE 529 MG/ML SOLUTION: Performed by: SURGERY

## 2023-07-30 RX ADMIN — GADOBENATE DIMEGLUMINE 14 ML: 529 INJECTION, SOLUTION INTRAVENOUS at 12:35

## 2023-07-31 ENCOUNTER — TELEPHONE (OUTPATIENT)
Dept: MAMMOGRAPHY | Facility: CLINIC | Age: 63
End: 2023-07-31
Payer: COMMERCIAL

## 2023-08-01 RX ORDER — ESTRADIOL 0.1 MG/G
CREAM VAGINAL
Qty: 42.5 G | Refills: 12 | Status: SHIPPED | OUTPATIENT
Start: 2023-08-01

## 2024-01-04 ENCOUNTER — APPOINTMENT (OUTPATIENT)
Dept: WOMENS IMAGING | Facility: HOSPITAL | Age: 64
End: 2024-01-04
Payer: COMMERCIAL

## 2024-01-04 PROCEDURE — 77063 BREAST TOMOSYNTHESIS BI: CPT | Performed by: RADIOLOGY

## 2024-01-04 PROCEDURE — 77067 SCR MAMMO BI INCL CAD: CPT | Performed by: RADIOLOGY

## 2024-05-29 ENCOUNTER — OFFICE VISIT (OUTPATIENT)
Dept: MAMMOGRAPHY | Facility: CLINIC | Age: 64
End: 2024-05-29
Payer: COMMERCIAL

## 2024-05-29 VITALS
WEIGHT: 160 LBS | HEIGHT: 66 IN | SYSTOLIC BLOOD PRESSURE: 141 MMHG | DIASTOLIC BLOOD PRESSURE: 71 MMHG | HEART RATE: 69 BPM | BODY MASS INDEX: 25.71 KG/M2 | OXYGEN SATURATION: 97 %

## 2024-05-29 DIAGNOSIS — Z91.89 AT HIGH RISK FOR BREAST CANCER: Primary | ICD-10-CM

## 2024-05-29 NOTE — PROGRESS NOTES
Subjective   Marie Yao is a 63 y.o. female     History of Present Illness   She is a nice 63-year-old white female noted to be at high risk for developing breast cancer mainly based on her family history.  She has a maternal grandmother who developed breast cancer at the age of 68.  It did recur 3 to 4 years later and she did not treat it.  She also has a sister with breast cancer at the age of 50.  Her genetic testing was negative.    The patient has a history of a right breast biopsy years ago which revealed a benign fibroadenoma.    Her imaging is in good order.  An MRI was performed in July 2023 which did not show any suspicious masslike or non-mass-like enhancement.  She still had a benign lesion in her liver.  She also had mammograms performed in January 2024.  I have personally reviewed those imaging studies along with the reports.  The breast tissue is nearly fatty replaced.  She has a couple of isolated calcifications in each breast but no suspicious masses or areas of architectural distortion.  The lymph nodes all look fine.    The patient has not palpated anything of concern.      Review of Systems constitutional-no unusual changes in weight or appetite.  Past Medical History:   Diagnosis Date    Breast lump 2003    bx showed benign fibroadenoma    Cervical dysplasia 1995    Cervical dysplasia 1982     Past Surgical History:   Procedure Laterality Date    BREAST BIOPSY Right 03/28/2003    Benign fibroadenoma    CERVICAL BIOPSY  W/ LOOP ELECTRODE EXCISION  04/19/1995    Ant CX: mild inflammation.  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  Margins neg.    COLONOSCOPY  2009    WNL    COLPOSCOPY W/ BIOPSY / CURETTAGE  03/01/1995    cx bx 7:00 mild dysplasia.    GASTRIC RESTRICTION SURGERY  1986    GYNECOLOGIC CRYOSURGERY  1982    LAPAROSCOPIC TUBAL LIGATION  12/2005     Family History   Problem Relation Age of Onset    Heart disease Mother         4 vessel bypass     Breast cancer Maternal  Grandmother 68    Breast cancer Sister 50     Social History     Socioeconomic History    Marital status:    Tobacco Use    Smoking status: Former     Passive exposure: Past    Smokeless tobacco: Never   Vaping Use    Vaping status: Never Used   Substance and Sexual Activity    Alcohol use: Yes     Alcohol/week: 3.0 standard drinks of alcohol     Types: 3 Glasses of wine per week    Drug use: Never    Sexual activity: Defer     Birth control/protection: Post-menopausal       Objective   Physical Exam  BMI is >= 25 and <30. (Overweight) The following options were offered after discussion;: exercise counseling/recommendations and information on healthy weight added to patient's after visit summary   Right breast-medium size and symmetrical.  There are some stretch marks in the upper part of the breast and a well-healed scar in the upper outer quadrant.  With the arms raised and the pectoralis muscle contracted, there was no skin dimpling or nipple retraction.  I did not palpate any masses in the breast that were concerning and there was no nipple discharge.  Left breast-medium size and symmetrical with some similarly placed stretch marks in the upper part of the breast.  There was no nipple discharge noted.  With the arms maneuvered I did not see any skin dimpling or nipple retraction.  There were no worrisome palpable masses within the breast.  Lymphatics-no cervical or axillary adenopathy.    Assessment & Plan   1.  At high risk for breast cancer-we recalculated the patient's risk assessment numbers.  Her lifetime risk is estimated at 21.6%.  Her 5-year risk is 3.7%.  Based on these findings, the patient would benefit from supplemental MRI imaging in addition to yearly 3D mammography.  We again talked about the possibility of chemoprevention but the patient is fine with increased surveillance alone.  Orders were placed for an MRI to be performed in July and I will call her with those results.  If everything  looks good, my plans are to see her back in the office in 1 year.  2.  Mildly overweight (BMI 25.8)-we talked about the importance of exercise and maintaining ideal body weight as lifestyle maneuvers that might decrease risk of developing breast cancer.  Information regarding healthy weight was added to the after visit summary.    The encounter diagnosis was At high risk for breast cancer.

## 2024-05-29 NOTE — LETTER
May 29, 2024     SABRINA Cole  [address]  [address]  [facility] [address]    Patient: Marie Yao   YOB: 1960   Date of Visit: 5/29/2024     Dear SABRINA Cole:       Thank you for referring Marie Yao to me for evaluation. Below are the relevant portions of my assessment and plan of care.    If you have questions, please do not hesitate to call me. I look forward to following Marie along with you.         Sincerely,        Jah Millard MD        CC: MD Marv Dsouza DO Hoagland, William P, MD  05/29/24 0819  Sign when Signing Visit  Subjective  Marie Yao is a 63 y.o. female     History of Present Illness   She is a nice 63-year-old white female noted to be at high risk for developing breast cancer mainly based on her family history.  She has a maternal grandmother who developed breast cancer at the age of 68.  It did recur 3 to 4 years later and she did not treat it.  She also has a sister with breast cancer at the age of 50.  Her genetic testing was negative.    The patient has a history of a right breast biopsy years ago which revealed a benign fibroadenoma.    Her imaging is in good order.  An MRI was performed in July 2023 which did not show any suspicious masslike or non-mass-like enhancement.  She still had a benign lesion in her liver.  She also had mammograms performed in January 2024.  I have personally reviewed those imaging studies along with the reports.  The breast tissue is nearly fatty replaced.  She has a couple of isolated calcifications in each breast but no suspicious masses or areas of architectural distortion.  The lymph nodes all look fine.    The patient has not palpated anything of concern.      Review of Systems constitutional-no unusual changes in weight or appetite.  Past Medical History:   Diagnosis Date   • Breast lump 2003    bx showed benign fibroadenoma   • Cervical dysplasia 1995   • Cervical dysplasia 1982      Past Surgical History:   Procedure Laterality Date   • BREAST BIOPSY Right 03/28/2003    Benign fibroadenoma   • CERVICAL BIOPSY  W/ LOOP ELECTRODE EXCISION  04/19/1995    Ant CX: mild inflammation.  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  Margins neg.   • COLONOSCOPY  2009    WNL   • COLPOSCOPY W/ BIOPSY / CURETTAGE  03/01/1995    cx bx 7:00 mild dysplasia.   • GASTRIC RESTRICTION SURGERY  1986   • GYNECOLOGIC CRYOSURGERY  1982   • LAPAROSCOPIC TUBAL LIGATION  12/2005     Family History   Problem Relation Age of Onset   • Heart disease Mother         4 vessel bypass    • Breast cancer Maternal Grandmother 68   • Breast cancer Sister 50     Social History     Socioeconomic History   • Marital status:    Tobacco Use   • Smoking status: Former     Passive exposure: Past   • Smokeless tobacco: Never   Vaping Use   • Vaping status: Never Used   Substance and Sexual Activity   • Alcohol use: Yes     Alcohol/week: 3.0 standard drinks of alcohol     Types: 3 Glasses of wine per week   • Drug use: Never   • Sexual activity: Defer     Birth control/protection: Post-menopausal       Objective  Physical Exam  BMI is >= 25 and <30. (Overweight) The following options were offered after discussion;: exercise counseling/recommendations and information on healthy weight added to patient's after visit summary   Right breast-medium size and symmetrical.  There are some stretch marks in the upper part of the breast and a well-healed scar in the upper outer quadrant.  With the arms raised and the pectoralis muscle contracted, there was no skin dimpling or nipple retraction.  I did not palpate any masses in the breast that were concerning and there was no nipple discharge.  Left breast-medium size and symmetrical with some similarly placed stretch marks in the upper part of the breast.  There was no nipple discharge noted.  With the arms maneuvered I did not see any skin dimpling or nipple retraction.  There  were no worrisome palpable masses within the breast.  Lymphatics-no cervical or axillary adenopathy.    Assessment & Plan  1.  At high risk for breast cancer-we recalculated the patient's risk assessment numbers.  Her lifetime risk is estimated at 21.6%.  Her 5-year risk is 3.7%.  Based on these findings, the patient would benefit from supplemental MRI imaging in addition to yearly 3D mammography.  We again talked about the possibility of chemoprevention but the patient is fine with increased surveillance alone.  Orders were placed for an MRI to be performed in July and I will call her with those results.  If everything looks good, my plans are to see her back in the office in 1 year.  2.  Mildly overweight (BMI 25.8)-we talked about the importance of exercise and maintaining ideal body weight as lifestyle maneuvers that might decrease risk of developing breast cancer.  Information regarding healthy weight was added to the after visit summary.    The encounter diagnosis was At high risk for breast cancer.

## 2024-08-07 ENCOUNTER — HOSPITAL ENCOUNTER (OUTPATIENT)
Facility: HOSPITAL | Age: 64
Discharge: HOME | End: 2024-08-07
Admitting: SURGERY
Payer: MEDICARE

## 2024-08-07 ENCOUNTER — DOCUMENTATION (OUTPATIENT)
Dept: MAMMOGRAPHY | Facility: CLINIC | Age: 64
End: 2024-08-07
Payer: MEDICARE

## 2024-08-07 DIAGNOSIS — Z91.89 AT HIGH RISK FOR BREAST CANCER: ICD-10-CM

## 2024-08-07 PROCEDURE — A9577 INJ MULTIHANCE: HCPCS | Performed by: SURGERY

## 2024-08-07 PROCEDURE — 0 GADOBENATE DIMEGLUMINE 529 MG/ML SOLUTION: Performed by: SURGERY

## 2024-08-07 PROCEDURE — C8937 CAD BREAST MRI: HCPCS

## 2024-08-07 PROCEDURE — C8908 MRI W/O FOL W/CONT, BREAST,: HCPCS

## 2024-08-07 RX ADMIN — GADOBENATE DIMEGLUMINE 15 ML: 529 INJECTION, SOLUTION INTRAVENOUS at 13:28

## 2024-08-07 NOTE — PROGRESS NOTES
I spoke with her today.  The breast MRI showed no suspicious enhancement on either side.  There is a stable 3 cm hepatic lesion previously worked up and noted to be a hemangioma.  It is similar in size going all the way back to 2021.

## 2024-08-15 ENCOUNTER — OFFICE VISIT (OUTPATIENT)
Dept: OBSTETRICS AND GYNECOLOGY | Age: 64
End: 2024-08-15
Payer: MEDICARE

## 2024-08-15 VITALS
BODY MASS INDEX: 25.23 KG/M2 | HEIGHT: 66 IN | SYSTOLIC BLOOD PRESSURE: 128 MMHG | WEIGHT: 157 LBS | DIASTOLIC BLOOD PRESSURE: 86 MMHG

## 2024-08-15 DIAGNOSIS — N94.10 FEMALE DYSPAREUNIA: ICD-10-CM

## 2024-08-15 DIAGNOSIS — Z12.4 ENCOUNTER FOR PAPANICOLAOU SMEAR FOR CERVICAL CANCER SCREENING: ICD-10-CM

## 2024-08-15 DIAGNOSIS — N95.8 GENITOURINARY SYNDROME OF MENOPAUSE: ICD-10-CM

## 2024-08-15 DIAGNOSIS — Z01.419 WELL WOMAN EXAM WITH ROUTINE GYNECOLOGICAL EXAM: Primary | ICD-10-CM

## 2024-08-15 DIAGNOSIS — Z11.51 SCREENING FOR HPV (HUMAN PAPILLOMAVIRUS): ICD-10-CM

## 2024-08-15 RX ORDER — ROSUVASTATIN CALCIUM 20 MG/1
20 TABLET, COATED ORAL
COMMUNITY

## 2024-08-15 NOTE — PROGRESS NOTES
"Subjective     Chief Complaint   Patient presents with   • Annual Exam     Annual exam (last annual was not with medicare) last pap 2021 neg/neg, m/g 2024 (breast mri 2024), dexa 2021, cologuard  neg       History of Present Illness    Marie Yao is a 63 y.o.  who presents for annual exam.    She is doing well    Has not been able to get the vaginal estrogen  [facility] runs out  She did call but \"got lost in the shuffle\"  Notes painful IC, especially with insertion  I would suggest looking into other pharmacies     [facility] in , polyps noted, will rpt in 5 years  Mammogram in January  MRI earlier this month-both wnl  Sees Dr Millard 6 months after me   Obstetric History:  OB History          0    Para   0    Term   0       0    AB   0    Living   0         SAB   0    IAB   0    Ectopic   0    Molar        Multiple   0    Live Births                   Menstrual History:     No LMP recorded. Patient is postmenopausal.         Current contraception: post menopausal status  History of abnormal Pap smear: yes - , mild dyplasia  Received Gardasil immunization: no  Perform regular self breast exam: yes - mthly  Family history of uterine or ovarian cancer: no  Family History of colon cancer: no  Family history of breast cancer: yes - sister at 50 and  MGM at 68 yoa-gene neg, at high risk     Mammogram: up to date.  Colonoscopy: up to date.  DEXA: ordered.    Exercise: moderately active  Calcium/Vitamin D: adequate intake    The following portions of the patient's history were reviewed and updated as appropriate: allergies, current medications, past family history, past medical history, past social history, past surgical history, and problem list.    Review of Systems   Genitourinary:  Positive for dyspareunia.   All other systems reviewed and are negative.    Review of Systems   Constitutional: Negative for fatigue.   Respiratory: Negative for shortness of breath.  " "  Gastrointestinal: Negative for abdominal pain.   Genitourinary: Negative for dysuria.   Neurological: Negative for headaches.   Psychiatric/Behavioral: Negative for dysphoric mood.         Objective   Physical Exam    /86   Ht 167.6 cm (66\")   Wt 71.2 kg (157 lb)   BMI 25.34 kg/m²   General:   alert, comfortable, and no distress   Heart: regular rate and rhythm   Lungs: clear to auscultation bilaterally   Breast: normal appearance, no masses or tenderness, Inspection negative, No nipple retraction or dimpling, No nipple discharge or bleeding, No axillary or supraclavicular adenopathy, Normal to palpation without dominant masses   Neck: no adenopathy and no carotid bruit   Abdomen: Not performed today   CVA: Not performed today   Pelvis: External genitalia: normal general appearance  Urinary system: urethral meatus normal  Vaginal: atrophic mucosa  Cervix: normal appearance and thin prep PAP obtained  Adnexa: normal bimanual exam and non palpable  Uterus: normal single, nontender  Bladder: wnl  Rectal: deferred  Exam limited by discomfort   Extremities: Not performed today   Neurologic: negative   Psychiatric: Normal affect, judgement, and mood     Assessment & Plan   Diagnoses and all orders for this visit:    1. Well woman exam with routine gynecological exam (Primary)    2. Female dyspareunia    3. Genitourinary syndrome of menopause    4. Encounter for Papanicolaou smear for cervical cancer screening  -     IGP, Aptima HPV, Rfx 16 / 18,45    5. Screening for HPV (human papillomavirus)  -     IGP, Aptima HPV, Rfx 16 / 18,45        All questions answered.  Breast self exam technique reviewed and patient encouraged to perform self-exam monthly.  Discussed healthy lifestyle modifications.  Recommended 30 minutes of aerobic exercise five times per week.  Discussed calcium needs to prevent osteoporosis.    Pap done, if wnl no longer needed  Enc to shop around for estrace, may be able to get it from [employer]. " She will let me know where to send it. Could send to compounding pharmacy if needed  [facility] for dyspareunia as well, she will let me know

## 2024-08-20 LAB
CYTOLOGIST CVX/VAG CYTO: NORMAL
CYTOLOGY CVX/VAG DOC CYTO: NORMAL
CYTOLOGY CVX/VAG DOC THIN PREP: NORMAL
DX ICD CODE: NORMAL
HPV GENOTYPE REFLEX: NORMAL
HPV I/H RISK 4 DNA CVX QL PROBE+SIG AMP: NEGATIVE
Lab: NORMAL
OTHER STN SPEC: NORMAL
STAT OF ADQ CVX/VAG CYTO-IMP: NORMAL

## 2024-08-21 RX ORDER — ESTRADIOL 0.1 MG/G
CREAM VAGINAL
Qty: 3 EACH | Refills: 4 | Status: SHIPPED | OUTPATIENT
Start: 2024-08-21

## 2025-02-05 ENCOUNTER — APPOINTMENT (OUTPATIENT)
Dept: WOMENS IMAGING | Facility: HOSPITAL | Age: 65
End: 2025-02-05
Payer: MEDICARE

## 2025-02-05 PROCEDURE — 77067 SCR MAMMO BI INCL CAD: CPT | Performed by: RADIOLOGY

## 2025-02-05 PROCEDURE — 77063 BREAST TOMOSYNTHESIS BI: CPT | Performed by: RADIOLOGY

## 2025-02-10 ENCOUNTER — TELEPHONE (OUTPATIENT)
Dept: MAMMOGRAPHY | Facility: CLINIC | Age: 65
End: 2025-02-10
Payer: COMMERCIAL

## 2025-02-11 ENCOUNTER — TELEPHONE (OUTPATIENT)
Dept: MAMMOGRAPHY | Facility: CLINIC | Age: 65
End: 2025-02-11
Payer: COMMERCIAL

## 2025-02-11 DIAGNOSIS — Z91.89 AT HIGH RISK FOR BREAST CANCER: Primary | ICD-10-CM

## 2025-02-11 NOTE — TELEPHONE ENCOUNTER
Pt aware that mammogram is normal.  Will schedule her Mri for Aug 2025 and her appt with brigido Gaffney.